# Patient Record
Sex: MALE | Race: WHITE | Employment: FULL TIME | URBAN - METROPOLITAN AREA
[De-identification: names, ages, dates, MRNs, and addresses within clinical notes are randomized per-mention and may not be internally consistent; named-entity substitution may affect disease eponyms.]

---

## 2023-12-24 ENCOUNTER — HOSPITAL ENCOUNTER (EMERGENCY)
Age: 23
Discharge: HOME | End: 2023-12-24
Attending: EMERGENCY MEDICINE
Payer: COMMERCIAL

## 2023-12-24 VITALS
BODY MASS INDEX: 35.98 KG/M2 | WEIGHT: 257 LBS | SYSTOLIC BLOOD PRESSURE: 136 MMHG | DIASTOLIC BLOOD PRESSURE: 81 MMHG | RESPIRATION RATE: 18 BRPM | OXYGEN SATURATION: 100 % | HEART RATE: 103 BPM | HEIGHT: 71 IN | TEMPERATURE: 98.3 F

## 2023-12-24 DIAGNOSIS — J11.1 INFLUENZA: Primary | ICD-10-CM

## 2023-12-24 LAB
FLUAV RNA RESP QL NAA+PROBE: DETECTED
FLUBV RNA RESP QL NAA+PROBE: NOT DETECTED
S PYO AG THROAT QL: NEGATIVE
SARS-COV-2 RNA RESP QL NAA+PROBE: NOT DETECTED

## 2023-12-24 PROCEDURE — 87081 CULTURE SCREEN ONLY: CPT

## 2023-12-24 PROCEDURE — 87636 SARSCOV2 & INF A&B AMP PRB: CPT

## 2023-12-24 PROCEDURE — 99284 EMERGENCY DEPT VISIT MOD MDM: CPT

## 2023-12-24 PROCEDURE — 87880 STREP A ASSAY W/OPTIC: CPT

## 2023-12-24 PROCEDURE — 6370000000 HC RX 637 (ALT 250 FOR IP): Performed by: STUDENT IN AN ORGANIZED HEALTH CARE EDUCATION/TRAINING PROGRAM

## 2023-12-24 PROCEDURE — 96360 HYDRATION IV INFUSION INIT: CPT

## 2023-12-24 PROCEDURE — 2580000003 HC RX 258: Performed by: STUDENT IN AN ORGANIZED HEALTH CARE EDUCATION/TRAINING PROGRAM

## 2023-12-24 PROCEDURE — 96361 HYDRATE IV INFUSION ADD-ON: CPT

## 2023-12-24 RX ORDER — SODIUM CHLORIDE, SODIUM LACTATE, POTASSIUM CHLORIDE, AND CALCIUM CHLORIDE .6; .31; .03; .02 G/100ML; G/100ML; G/100ML; G/100ML
1000 INJECTION, SOLUTION INTRAVENOUS ONCE
Status: COMPLETED | OUTPATIENT
Start: 2023-12-24 | End: 2023-12-24

## 2023-12-24 RX ORDER — OSELTAMIVIR PHOSPHATE 75 MG/1
75 CAPSULE ORAL 2 TIMES DAILY
Qty: 10 CAPSULE | Refills: 0 | Status: SHIPPED | OUTPATIENT
Start: 2023-12-24 | End: 2023-12-29

## 2023-12-24 RX ORDER — ONDANSETRON 4 MG/1
8 TABLET, ORALLY DISINTEGRATING ORAL 3 TIMES DAILY PRN
Qty: 21 TABLET | Refills: 0 | Status: SHIPPED | OUTPATIENT
Start: 2023-12-24

## 2023-12-24 RX ORDER — IBUPROFEN 400 MG/1
800 TABLET ORAL ONCE
Status: COMPLETED | OUTPATIENT
Start: 2023-12-24 | End: 2023-12-24

## 2023-12-24 RX ORDER — RIMEGEPANT SULFATE 75 MG/75MG
TABLET, ORALLY DISINTEGRATING ORAL
COMMUNITY

## 2023-12-24 RX ORDER — FLUDROCORTISONE ACETATE 0.1 MG/1
0.1 TABLET ORAL DAILY
COMMUNITY

## 2023-12-24 RX ORDER — MIDODRINE HYDROCHLORIDE 2.5 MG/1
2.5 TABLET ORAL 3 TIMES DAILY
COMMUNITY

## 2023-12-24 RX ADMIN — IBUPROFEN 800 MG: 400 TABLET, FILM COATED ORAL at 13:57

## 2023-12-24 RX ADMIN — SODIUM CHLORIDE, POTASSIUM CHLORIDE, SODIUM LACTATE AND CALCIUM CHLORIDE 1000 ML: 600; 310; 30; 20 INJECTION, SOLUTION INTRAVENOUS at 13:55

## 2023-12-24 NOTE — DISCHARGE INSTRUCTIONS
You were seen in the emergency department for influenza. You are being started on a 5-day course of Tamiflu. For nausea, you are being prescribed Zofran. You take 1 pill up to every 6 hours    Otherwise stay well-hydrated with plenty of fluids. Alternate Tylenol and ibuprofen as needed for body aches, fevers, chills. Follow-up with your PCP as needed.      Return to the ED if you develop worsening symptoms

## 2023-12-24 NOTE — ED PROVIDER NOTES
ED Attending Attestation Note     Date of evaluation: 12/24/2023    This patient was seen by the resident. I have seen and examined the patient, agree with the workup, evaluation, management and diagnosis. The care plan has been discussed. I have reviewed the ECG and concur with the resident's interpretation. My assessment reveals a well-appearing young male who presents to the emergency room today complaining of several days of congestion rhinorrhea sore throat. On exam he is well-appearing posterior oropharynx is unremarkable in appearance he has no audible murmurs rubs or gallops he is mildly tachycardic extremities are warm and well-perfused he has clear breath sounds to auscultation bilaterally.      Lisa Singh MD  12/24/23 [id number]

## 2023-12-24 NOTE — ED PROVIDER NOTES
[facility], formerly [facility]          EM RESIDENT NOTE       Date of evaluation: 12/24/2023    Chief Complaint     Chills (Pt reports having a cold for several days, also complications with pots)      History of Present Illness     Iván Nathan is a 21 y.o. male with a PMHx of POTS who presents sore throat, nasal congestion. According to the patient, he developed a sore throat over the past 72 hours. Associated with nasal congestion. Reports a mild cough. Denies any significant productive sputum. No reported facial tenderness. Reports objective chills, yet no recorded fevers. States that he was recently visiting family members, 1 of which developed some URI symptoms. He is unvaccinated against COVID and flu. Of note, patient reports a history of POTS. States that he feels that he is not able to drink enough to \"keep up\". Reports drinking 4 L today, as well as taking salt tablets. Presents to the emergency department with concerns of low blood pressure readings. Other than that mentioned above, there are no other alleviating or provoking factors associated with the patient's presentation today. Past Medical, Surgical, Family, and Social History     He has no past medical history on file. He has no past surgical history on file. His family history is not on file. He reports that he has never smoked. He has never used smokeless tobacco. He reports that he does not currently use alcohol. He reports that he does not use drugs.     Medications     Previous Medications    FLUDROCORTISONE (FLORINEF) 0.1 MG TABLET    Take 1 tablet by mouth daily    MIDODRINE (PROAMATINE) 2.5 MG TABLET    Take 1 tablet by mouth 3 times daily    MULTIPLE VITAMINS-MINERALS (MULTI COMPLETE PO)    Take by mouth    ORAL ELECTROLYTES (THERMOTABS PO)    Take by mouth    PSEUDOEPHEDRINE HCL (12 HOUR DECONGESTANT PO)    Take by mouth    RIMEGEPANT SULFATE (NURTEC) 75 MG TBDP    Take by mouth

## 2023-12-26 LAB — S PYO THROAT QL CULT: NORMAL
